# Patient Record
Sex: FEMALE | Race: WHITE | Employment: FULL TIME | URBAN - METROPOLITAN AREA
[De-identification: names, ages, dates, MRNs, and addresses within clinical notes are randomized per-mention and may not be internally consistent; named-entity substitution may affect disease eponyms.]

---

## 2017-02-25 ENCOUNTER — HOSPITAL ENCOUNTER (OUTPATIENT)
Age: 32
Discharge: HOME | End: 2017-02-25
Attending: FAMILY MEDICINE
Payer: MEDICAID

## 2017-02-25 VITALS
TEMPERATURE: 98 F | OXYGEN SATURATION: 98 % | HEIGHT: 62 IN | DIASTOLIC BLOOD PRESSURE: 82 MMHG | RESPIRATION RATE: 16 BRPM | SYSTOLIC BLOOD PRESSURE: 128 MMHG | WEIGHT: 165 LBS | BODY MASS INDEX: 30.36 KG/M2 | HEART RATE: 87 BPM

## 2017-02-25 DIAGNOSIS — J20.9 ACUTE BRONCHITIS, UNSPECIFIED ORGANISM: Primary | ICD-10-CM

## 2017-02-25 PROCEDURE — 99204 OFFICE O/P NEW MOD 45 MIN: CPT

## 2017-02-25 PROCEDURE — 99203 OFFICE O/P NEW LOW 30 MIN: CPT

## 2017-02-25 RX ORDER — AMOXICILLIN 875 MG/1
875 TABLET, COATED ORAL 2 TIMES DAILY
Qty: 20 TABLET | Refills: 0 | Status: SHIPPED | OUTPATIENT
Start: 2017-02-25 | End: 2017-03-07

## 2017-02-25 RX ORDER — NITROFURANTOIN MACROCRYSTALS 100 MG/1
100 CAPSULE ORAL 4 TIMES DAILY
COMMUNITY

## 2017-02-25 NOTE — ED NOTES
Patient is currently taking abx for bacteria in her urine. Patient will be on it the remainder of pregnancy.

## 2017-02-25 NOTE — ED PROVIDER NOTES
Patient presents with:  Cough/URI      HPI:   Breanna Hughes is a 33year old female who presents with complaints of chest congestion, productive cough with colored sputum, rib pain from coughing for  3  weeks on and off.  Also c/o nasal congestion, rhin above      EXAM:   /82 mmHg  Pulse 87  Temp(Src) 98.3 °F (36.8 °C) (Temporal)  Resp 16  Ht 157.5 cm (5' 2\")  Wt 74.844 kg  BMI 30.17 kg/m2  SpO2 98%  LMP 08/25/2016  General appearance: alert, appears stated age and cooperative  Head: Normocephalic, condition today. Discharge Medications: Rx Amoxicillin   Steam inhalation  Plenty of fluids  Rest  Mucolytic for cough.    Follow up with pcp in 5-7 days   Return to clinic if not improving in 2 days for recheck   The patient should present to ER with

## (undated) NOTE — ED AVS SNAPSHOT
[facility] in [facility] [address] [id number]    Phone:  [phone]    Fax:  [address]   MRN: [id number]    Department:  [facility] [place]   Date of Visit:  2/25/2017           Lee Hunter If you have any problems with your follow-up, please call our  at [phone]. Si usted tiene algun problema con pavon sequimiento, por favor llame a nuestro adminstrador de casos al [phone].     Expect to receive an electronic reques Yue Roach [address] [facility] ([address]) [address] [facility]. ([address]) [address]  ([address]) [address] [facility] Enter your [email or web address] Activation Code exactly as it appears below along with your Zip Code and Date of Birth to complete the sign-up process. If you do not sign up before the expiration date, you must request a new code.     Your unique [email or web address] Access Code: [email or web address]